# Patient Record
Sex: FEMALE | Race: WHITE
[De-identification: names, ages, dates, MRNs, and addresses within clinical notes are randomized per-mention and may not be internally consistent; named-entity substitution may affect disease eponyms.]

---

## 2023-04-07 ENCOUNTER — HOSPITAL ENCOUNTER
Age: 63
End: 2023-04-07
Payer: MEDICARE

## 2023-04-07 DIAGNOSIS — M25.572: Primary | ICD-10-CM

## 2023-04-07 DIAGNOSIS — M79.89: ICD-10-CM

## 2023-04-07 PROCEDURE — 73610 X-RAY EXAM OF ANKLE: CPT

## 2023-08-01 ENCOUNTER — OFFICE (OUTPATIENT)
Dept: URBAN - NONMETROPOLITAN AREA CLINIC 1 | Facility: CLINIC | Age: 63
End: 2023-08-01
Payer: MEDICARE

## 2023-08-01 ENCOUNTER — OFFICE (OUTPATIENT)
Dept: URBAN - NONMETROPOLITAN AREA CLINIC 1 | Facility: CLINIC | Age: 63
End: 2023-08-01

## 2023-08-01 VITALS
WEIGHT: 184 LBS | SYSTOLIC BLOOD PRESSURE: 118 MMHG | SYSTOLIC BLOOD PRESSURE: 118 MMHG | DIASTOLIC BLOOD PRESSURE: 84 MMHG | HEART RATE: 86 BPM | DIASTOLIC BLOOD PRESSURE: 84 MMHG | HEIGHT: 65 IN | WEIGHT: 184 LBS | DIASTOLIC BLOOD PRESSURE: 84 MMHG | HEIGHT: 65 IN | HEIGHT: 65 IN | SYSTOLIC BLOOD PRESSURE: 118 MMHG | WEIGHT: 184 LBS | HEART RATE: 86 BPM | HEART RATE: 86 BPM

## 2023-08-01 VITALS — HEIGHT: 65 IN

## 2023-08-01 DIAGNOSIS — K76.0 FATTY (CHANGE OF) LIVER, NOT ELSEWHERE CLASSIFIED: ICD-10-CM

## 2023-08-01 DIAGNOSIS — R74.8 ABNORMAL LEVELS OF OTHER SERUM ENZYMES: ICD-10-CM

## 2023-08-01 DIAGNOSIS — R19.8 OTHER SPECIFIED SYMPTOMS AND SIGNS INVOLVING THE DIGESTIVE S: ICD-10-CM

## 2023-08-01 DIAGNOSIS — R10.9 UNSPECIFIED ABDOMINAL PAIN: ICD-10-CM

## 2023-08-01 PROCEDURE — 99204 OFFICE O/P NEW MOD 45 MIN: CPT | Mod: 25 | Performed by: NURSE PRACTITIONER

## 2023-08-01 PROCEDURE — 99214 OFFICE O/P EST MOD 30 MIN: CPT | Mod: 25 | Performed by: NURSE PRACTITIONER

## 2023-08-01 PROCEDURE — 76981 USE PARENCHYMA: CPT | Performed by: NURSE PRACTITIONER

## 2023-08-01 NOTE — SERVICEHPINOTES
She comes in today with complaints of low abdomen cramping and a change in her bowel habits.  She also has a history of fatty liver disease. She says her liver labs have been persistently elevated.   She has had trouble with low abdomen pains that she describes as mild but persistent.  Her bowel movements have changed over the past few months.  Normally she has looser stools and now she has constipation.  She remembers having a colonoscopy 4 or 5 years ago through the [facility], and wants to have it done again now.  I have received and reviewed her records from the [facility]. karin frazier CT abd/pelvis- 
br 7/7/2023- negative for acute findings. No evidence of acute abdominal/ pelvic process. Diverticulosis coli without evidence of acute diverticulitis.   karin frazier  EGD 3/14/2023 by Dr. Jesus- findings: At the esophageal cardia, nonerosive reflux disease was present. There was erythema but no discrete erosions. The gastroesophageal junction (upper level of gastric folds) was located 38cm from the incisors. The stomach was examined and no abnormalities were seen. The duodenum was examined and no abnormalities were seen.
karin frazier  Fibroscan today shows severe steatosis (F3), and no fibrosis (F0-1)

## 2023-08-01 NOTE — SERVICEHPINOTES
She comes in today with complaints of low abdomen cramping and a change in her bowel habits.  She also has a history of fatty liver disease. She says her liver labs have been persistently elevated.   She has had trouble with low abdomen pains that she describes as mild but persistent.  Her bowel movements have changed over the past few months.  Normally she has looser stools and now she has constipation.  She remembers having a colonoscopy 4 or 5 years ago through the [facility], and wants to have it done again now.  I have received and reviewed her records from the [facility]. karin frazier CT abd/pelvis- 
br 7/7/2023- negative for acute findings. No evidence of acute abdominal/ pelvic process. Diverticulosis coli without evidence of acute diverticulitis.   karin frazier  EGD 3/14/2023 by Dr. Jesus- findings: At the esophageal cardia, nonerosive reflux disease was present. There was erythema but no discrete erosions. The gastroesophageal junction (upper level of gastric folds) was located 38cm from the incisors. The stomach was examined and no abnormalities were seen. The duodenum was examined and no abnormalities were seen.
karin frazier  Fibroscan today shows severe steatosis (F3), and no fibrosis (F0-1)

## 2023-08-01 NOTE — SERVICEHPINOTES
She comes in today with complaints of low abdomen cramping and a change in her bowel habits.  She also has a history of fatty liver disease. She says her liver labs have been persistently elevated.   She has had trouble with low abdomen pains that she describes as mild but persistent.  Her bowel movements have changed over the past few months.  Normally she has looser stools and now she has constipation.  She remembers having a colonoscopy 4 or 5 years ago through the [facility], and wants to have it done again now.  I have received and reviewed her records from the [facility]. karin frazier CT abd/pelvis- 
br 7/7/2023- negative for acute findings. No evidence of acute abdominal/ pelvic process. Diverticulosis coli without evidence of acute diverticulitis.   karin frazier  EGD 3/14/2023 by Dr. Jesus- findings: At the esophageal cardia, nonerosive reflux disease was present. There was erythema but no discrete erosions. The gastroesophageal junction (upper level of gastric folds) was located 38cm from the incisors. The stomach was examined and no abnormalities were seen. The duodenum was examined and no abnormalities were seen.
karin frazier  Fibroscan today shows severe steatosis (F3), and no fibrosis (F0-1)

## 2023-08-04 LAB
ACTIN (SMOOTH MUSCLE) ANTIBODY: 3 UNITS (ref 0–19)
ACUTE HEPATITIS: HBSAG SCREEN: NEGATIVE
ACUTE HEPATITIS: HCV AB: NON REACTIVE
ACUTE HEPATITIS: HEP A AB, IGM: NEGATIVE
ACUTE HEPATITIS: HEP B CORE AB, IGM: NEGATIVE
ALPHA-1-ANTITRYPSIN PHENOTYP: ALPHA-1-ANTITRYPSIN, SERUM: 156 MG/DL (ref 101–187)
ALPHA-1-ANTITRYPSIN PHENOTYP: PHENOTYPE (PI): (no result)
ANA BY IFA RFX TITER/PATTERN: NEGATIVE
CBC WITH DIFFERENTIAL/PLATELET: BASO (ABSOLUTE): 0 X10E3/UL (ref 0–0.2)
CBC WITH DIFFERENTIAL/PLATELET: BASOS: 1 %
CBC WITH DIFFERENTIAL/PLATELET: EOS (ABSOLUTE): 0.1 X10E3/UL (ref 0–0.4)
CBC WITH DIFFERENTIAL/PLATELET: EOS: 2 %
CBC WITH DIFFERENTIAL/PLATELET: HEMATOCRIT: 47.6 % — HIGH (ref 34–46.6)
CBC WITH DIFFERENTIAL/PLATELET: HEMATOLOGY COMMENTS: (no result)
CBC WITH DIFFERENTIAL/PLATELET: HEMOGLOBIN: 16.5 G/DL — HIGH (ref 11.1–15.9)
CBC WITH DIFFERENTIAL/PLATELET: IMMATURE CELLS: (no result)
CBC WITH DIFFERENTIAL/PLATELET: IMMATURE GRANS (ABS): 0 X10E3/UL (ref 0–0.1)
CBC WITH DIFFERENTIAL/PLATELET: IMMATURE GRANULOCYTES: 0 %
CBC WITH DIFFERENTIAL/PLATELET: LYMPHS (ABSOLUTE): 1.6 X10E3/UL (ref 0.7–3.1)
CBC WITH DIFFERENTIAL/PLATELET: LYMPHS: 29 %
CBC WITH DIFFERENTIAL/PLATELET: MCH: 31.1 PG (ref 26.6–33)
CBC WITH DIFFERENTIAL/PLATELET: MCHC: 34.7 G/DL (ref 31.5–35.7)
CBC WITH DIFFERENTIAL/PLATELET: MCV: 90 FL (ref 79–97)
CBC WITH DIFFERENTIAL/PLATELET: MONOCYTES(ABSOLUTE): 0.7 X10E3/UL (ref 0.1–0.9)
CBC WITH DIFFERENTIAL/PLATELET: MONOCYTES: 12 %
CBC WITH DIFFERENTIAL/PLATELET: NEUTROPHILS (ABSOLUTE): 3.1 X10E3/UL (ref 1.4–7)
CBC WITH DIFFERENTIAL/PLATELET: NEUTROPHILS: 56 %
CBC WITH DIFFERENTIAL/PLATELET: NRBC: (no result)
CBC WITH DIFFERENTIAL/PLATELET: PLATELETS: 225 X10E3/UL (ref 150–450)
CBC WITH DIFFERENTIAL/PLATELET: RBC: 5.31 X10E6/UL — HIGH (ref 3.77–5.28)
CBC WITH DIFFERENTIAL/PLATELET: RDW: 12.9 % (ref 11.7–15.4)
CBC WITH DIFFERENTIAL/PLATELET: WBC: 5.6 X10E3/UL (ref 3.4–10.8)
CERULOPLASMIN: 29.5 MG/DL (ref 19–39)
COMP. METABOLIC PANEL (14): A/G RATIO: 2 (ref 1.2–2.2)
COMP. METABOLIC PANEL (14): ALBUMIN: 4.8 G/DL (ref 3.9–4.9)
COMP. METABOLIC PANEL (14): ALKALINE PHOSPHATASE: 79 IU/L (ref 44–121)
COMP. METABOLIC PANEL (14): ALT (SGPT): 22 IU/L (ref 0–32)
COMP. METABOLIC PANEL (14): AST (SGOT): 22 IU/L (ref 0–40)
COMP. METABOLIC PANEL (14): BILIRUBIN, TOTAL: 1 MG/DL (ref 0–1.2)
COMP. METABOLIC PANEL (14): BUN/CREATININE RATIO: 19 (ref 12–28)
COMP. METABOLIC PANEL (14): BUN: 12 MG/DL (ref 8–27)
COMP. METABOLIC PANEL (14): CALCIUM: 9.7 MG/DL (ref 8.7–10.3)
COMP. METABOLIC PANEL (14): CARBON DIOXIDE, TOTAL: 23 MMOL/L (ref 20–29)
COMP. METABOLIC PANEL (14): CHLORIDE: 102 MMOL/L (ref 96–106)
COMP. METABOLIC PANEL (14): CREATININE: 0.64 MG/DL (ref 0.57–1)
COMP. METABOLIC PANEL (14): EGFR: 98 ML/MIN/1.73 (ref 59–?)
COMP. METABOLIC PANEL (14): GLOBULIN, TOTAL: 2.4 G/DL (ref 1.5–4.5)
COMP. METABOLIC PANEL (14): GLUCOSE: 82 MG/DL (ref 70–99)
COMP. METABOLIC PANEL (14): POTASSIUM: 4.2 MMOL/L (ref 3.5–5.2)
COMP. METABOLIC PANEL (14): PROTEIN, TOTAL: 7.2 G/DL (ref 6–8.5)
COMP. METABOLIC PANEL (14): SODIUM: 141 MMOL/L (ref 134–144)
FE+TIBC+FER: FERRITIN: 240 NG/ML — HIGH (ref 15–150)
FE+TIBC+FER: IRON BIND.CAP.(TIBC): 333 UG/DL (ref 250–450)
FE+TIBC+FER: IRON SATURATION: 22 % (ref 15–55)
FE+TIBC+FER: IRON: 72 UG/DL (ref 27–139)
FE+TIBC+FER: UIBC: 261 UG/DL (ref 118–369)
INTERPRETATION: (no result)
MITOCHONDRIAL (M2) ANTIBODY: <20 UNITS
PROTHROMBIN TIME (PT): INR: 1 (ref 0.9–1.2)
PROTHROMBIN TIME (PT): PROTHROMBIN TIME: 10.3 SEC (ref 9.1–12)
PTT, ACTIVATED: APTT: 29 SEC (ref 24–33)

## 2023-11-07 ENCOUNTER — ON CAMPUS - OUTPATIENT (OUTPATIENT)
Dept: URBAN - NONMETROPOLITAN AREA HOSPITAL 34 | Facility: HOSPITAL | Age: 63
End: 2023-11-07

## 2023-11-07 DIAGNOSIS — R19.4 CHANGE IN BOWEL HABIT: ICD-10-CM

## 2023-11-07 DIAGNOSIS — D12.5 BENIGN NEOPLASM OF SIGMOID COLON: ICD-10-CM

## 2023-11-07 PROCEDURE — 45385 COLONOSCOPY W/LESION REMOVAL: CPT | Performed by: INTERNAL MEDICINE
